# Patient Record
Sex: MALE | Race: WHITE | NOT HISPANIC OR LATINO | Employment: FULL TIME | URBAN - METROPOLITAN AREA
[De-identification: names, ages, dates, MRNs, and addresses within clinical notes are randomized per-mention and may not be internally consistent; named-entity substitution may affect disease eponyms.]

---

## 2018-11-30 ENCOUNTER — HOSPITAL ENCOUNTER (OUTPATIENT)
Dept: URGENT CARE | Facility: CLINIC | Age: 28
Setting detail: SPECIMEN
Discharge: HOME | End: 2018-11-30
Attending: FAMILY MEDICINE | Admitting: FAMILY MEDICINE

## 2018-11-30 LAB
C TRACH RRNA SPEC QL PROBE: NORMAL
N GONORRHOEA RRNA SPEC QL PROBE: NORMAL
SPECIMEN SOURCE: NORMAL

## 2019-03-01 ENCOUNTER — HOSPITAL ENCOUNTER (OUTPATIENT)
Dept: URGENT CARE | Facility: CLINIC | Age: 29
Setting detail: SPECIMEN
Discharge: HOME | End: 2019-03-01
Attending: EMERGENCY MEDICINE | Admitting: EMERGENCY MEDICINE

## 2019-03-01 LAB
BACTERIA SPEC AEROBE CULT: NORMAL
Lab: NORMAL
MICRO REPORT STATUS: NORMAL
SPECIMEN SOURCE: NORMAL

## 2020-10-08 PROCEDURE — U0003 INFECTIOUS AGENT DETECTION BY NUCLEIC ACID (DNA OR RNA); SEVERE ACUTE RESPIRATORY SYNDROME CORONAVIRUS 2 (SARS-COV-2) (CORONAVIRUS DISEASE [COVID-19]), AMPLIFIED PROBE TECHNIQUE, MAKING USE OF HIGH THROUGHPUT TECHNOLOGIES AS DESCRIBED BY CMS-2020-01-R: HCPCS | Performed by: NURSE PRACTITIONER

## 2020-10-09 ENCOUNTER — TELEPHONE (OUTPATIENT)
Dept: URGENT CARE | Facility: CLINIC | Age: 30
End: 2020-10-09

## 2025-01-20 ENCOUNTER — HOSPITAL ENCOUNTER (EMERGENCY)
Facility: HOSPITAL | Age: 35
Discharge: HOME | End: 2025-01-20
Admitting: EMERGENCY MEDICINE
Payer: COMMERCIAL

## 2025-01-20 ENCOUNTER — APPOINTMENT (OUTPATIENT)
Dept: GENERAL RADIOLOGY | Facility: HOSPITAL | Age: 35
End: 2025-01-20
Payer: COMMERCIAL

## 2025-01-20 VITALS
SYSTOLIC BLOOD PRESSURE: 134 MMHG | HEIGHT: 70 IN | RESPIRATION RATE: 16 BRPM | BODY MASS INDEX: 32.35 KG/M2 | DIASTOLIC BLOOD PRESSURE: 70 MMHG | WEIGHT: 225.97 LBS | OXYGEN SATURATION: 94 % | TEMPERATURE: 98.9 F | HEART RATE: 101 BPM

## 2025-01-20 DIAGNOSIS — R05.1 ACUTE COUGH: ICD-10-CM

## 2025-01-20 DIAGNOSIS — J18.9 COMMUNITY ACQUIRED PNEUMONIA OF RIGHT UPPER LOBE OF LUNG: Primary | ICD-10-CM

## 2025-01-20 DIAGNOSIS — R50.9 FEVER IN ADULT: ICD-10-CM

## 2025-01-20 LAB
B PARAPERT DNA SPEC QL NAA+PROBE: NOT DETECTED
B PERT DNA SPEC QL NAA+PROBE: NOT DETECTED
C PNEUM DNA NPH QL NAA+NON-PROBE: NOT DETECTED
FLUAV SUBTYP SPEC NAA+PROBE: NOT DETECTED
FLUBV RNA ISLT QL NAA+PROBE: NOT DETECTED
HADV DNA SPEC NAA+PROBE: NOT DETECTED
HCOV 229E RNA SPEC QL NAA+PROBE: NOT DETECTED
HCOV HKU1 RNA SPEC QL NAA+PROBE: NOT DETECTED
HCOV NL63 RNA SPEC QL NAA+PROBE: NOT DETECTED
HCOV OC43 RNA SPEC QL NAA+PROBE: NOT DETECTED
HMPV RNA NPH QL NAA+NON-PROBE: NOT DETECTED
HPIV1 RNA ISLT QL NAA+PROBE: NOT DETECTED
HPIV2 RNA SPEC QL NAA+PROBE: NOT DETECTED
HPIV3 RNA NPH QL NAA+PROBE: NOT DETECTED
HPIV4 P GENE NPH QL NAA+PROBE: NOT DETECTED
M PNEUMO IGG SER IA-ACNC: NOT DETECTED
RHINOVIRUS RNA SPEC NAA+PROBE: NOT DETECTED
RSV RNA NPH QL NAA+NON-PROBE: NOT DETECTED
S PYO AG THROAT QL: NEGATIVE
SARS-COV-2 RNA RESP QL NAA+PROBE: NOT DETECTED

## 2025-01-20 PROCEDURE — 99283 EMERGENCY DEPT VISIT LOW MDM: CPT

## 2025-01-20 PROCEDURE — 0202U NFCT DS 22 TRGT SARS-COV-2: CPT

## 2025-01-20 PROCEDURE — 87651 STREP A DNA AMP PROBE: CPT

## 2025-01-20 PROCEDURE — 71045 X-RAY EXAM CHEST 1 VIEW: CPT

## 2025-01-20 RX ORDER — IBUPROFEN 400 MG/1
800 TABLET, FILM COATED ORAL ONCE
Status: COMPLETED | OUTPATIENT
Start: 2025-01-20 | End: 2025-01-20

## 2025-01-20 RX ORDER — BROMPHENIRAMINE MALEATE, PSEUDOEPHEDRINE HYDROCHLORIDE, AND DEXTROMETHORPHAN HYDROBROMIDE 2; 30; 10 MG/5ML; MG/5ML; MG/5ML
5 SYRUP ORAL 4 TIMES DAILY PRN
Qty: 100 ML | Refills: 0 | Status: SHIPPED | OUTPATIENT
Start: 2025-01-20 | End: 2025-01-25

## 2025-01-20 RX ADMIN — IBUPROFEN 800 MG: 400 TABLET, FILM COATED ORAL at 10:14

## 2025-01-20 NOTE — DISCHARGE INSTRUCTIONS
Take antibiotics twice daily for the next 5 days.  Finish the entire course.  Take Bromfed as needed for cough and congestion.  Alternate use of Tylenol and ibuprofen as needed for fever.  Rest.  Increase your fluid intake.    Follow up with your primary care provider as needed. If you do not have a primary care provider, contact [facility] to establish one. Information has been provided to you in this document.     Return to the ER for new or worsening symptoms.

## 2025-01-20 NOTE — ED PROVIDER NOTES
Subjective   History of Present Illness  Patient is a 34-year-old  male with no premedical history who presents to the emergency room with complaints of a fever that been ongoing for nearly a week.  He was seen several days ago at urgent care center, where he tested negative for COVID and influenza but has not improved.  He states fever has ranged Tmax 102.6 and he has been treating it with Tylenol and ibuprofen.  He has not had Tylenol today but states he took it at 8 PM last night.  Has been managing his fever but he also complains of chest congestion, intermittent productive cough, sore throat and bodyaches.  He denies any recent travel or known exposure to illness.  He has had no abdominal pain, dysuria, chest pain.    PCP: none      Review of Systems   Constitutional:  Positive for chills and fever. Negative for appetite change.   HENT:  Positive for congestion. Negative for rhinorrhea.    Respiratory:  Positive for cough.    Cardiovascular:  Negative for chest pain.   Gastrointestinal:  Negative for abdominal pain and nausea.   Genitourinary:  Negative for dysuria.   Neurological:  Positive for headaches.   All other systems reviewed and are negative.      No past medical history on file.    No Known Allergies    No past surgical history on file.    No family history on file.    Social History     Socioeconomic History    Marital status: Single   Tobacco Use    Smoking status: Never    Smokeless tobacco: Never   Vaping Use    Vaping status: Never Used   Substance and Sexual Activity    Alcohol use: Not Currently    Drug use: Never    Sexual activity: Defer           Objective   Physical Exam  Vitals and nursing note reviewed.   Constitutional:       General: He is not in acute distress.     Appearance: Normal appearance. He is obese. He is not ill-appearing.   HENT:      Head: Normocephalic and atraumatic.   Eyes:      Pupils: Pupils are equal, round, and reactive to light.   Cardiovascular:      Rate  "and Rhythm: Normal rate and regular rhythm.      Heart sounds: Normal heart sounds. No murmur heard.  Pulmonary:      Effort: No respiratory distress.      Breath sounds: Normal breath sounds.   Abdominal:      General: Bowel sounds are normal.      Tenderness: There is no abdominal tenderness.   Neurological:      Mental Status: He is alert and oriented to person, place, and time.         Procedures           ED Course      /70   Pulse 101   Temp (!) 102.6 °F (39.2 °C) (Oral)   Resp 16   Ht 177.8 cm (70\")   Wt 102 kg (225 lb 15.5 oz)   SpO2 94%   BMI 32.42 kg/m²   Labs Reviewed   RAPID STREP A SCREEN - Normal   RESPIRATORY PANEL PCR W/ COVID-19 (SARS-COV-2), NP SWAB IN UTM/VTP, 2 HR TAT - Normal    Narrative:     In the setting of a positive respiratory panel with a viral infection PLUS a negative procalcitonin without other underlying concern for bacterial infection, consider observing off antibiotics or discontinuation of antibiotics and continue supportive care. If the respiratory panel is positive for atypical bacterial infection (Bordetella pertussis, Chlamydophila pneumoniae, or Mycoplasma pneumoniae), consider antibiotic de-escalation to target atypical bacterial infection.     Medications   ibuprofen (ADVIL,MOTRIN) tablet 800 mg (800 mg Oral Given 1/20/25 1014)     XR Chest 1 View    Result Date: 1/20/2025  Impression: Findings consistent with the appearance of right upper lobe pneumonia. Radiographic follow-up to document resolution is suggested. Electronically Signed: Belkis Allan MD  1/20/2025 11:03 AM EST  Workstation ID: [id number]                                                    Medical Decision Making  Patient is a 34-year-old obese  male with no prior medical history who presents the emergency room with complaints of fever that has been ongoing for the past week.  On exam, patient is alert and not ill-appearing.  He has normal S1/S2 without clicks murmurs.  No JVD.  Lungs " clear to auscultation in all fields.  He is not hypoxic.  Initial differentials include viral syndrome, pneumonia, strep pharyngitis, mononucleosis.  This is not a complete list.    Patient received above examination.  At time of exam, he was febrile and was given ibuprofen for treatment of fever.  Respiratory panel found to be negative.  My interpretation of chest x-ray reveals infiltration in the right upper lobe concerning for pneumonia.  This a concurrent radiologist, who notes no other acute findings.  Upon reassessment, his temperature has improved.  He is hemodynamically stable with no acute distress.  Results discussed with patient and he is agreeable to discharge with outpatient therapy at this time.  He will be discharged with prescription of Bromfed and Augmentin.  Patient is ambulatory upright, steadily without assistance upon discharge.  No acute distress noted.    I discussed the findings with patient who voices understanding of discharge instructions, signs and symptoms requiring return to the ED; discharged improved and stable condition with follow-up for reevaluation.    Patient is aware that discharge does not mean that nothing is wrong but it indicates no emergency is present and they must continue care with follow-up as given below or physician of their choice.    This document is intended for medical expert use only.  Reading of this document by patients and/or patient's family without participating medical staff guidance may result in misinterpretation and unintended morbidity.  Any interpretation of such data is the responsibility of the patient and/or family member responsible for the patient in concert with their primary or specialist providers, not to be left for sources of online search as such as [employer], [employer] or similar queries.  Relying on these approaches to knowledge may result in misinterpretation, misguided goals of care and even death should patient or family members try  recommendations outside of the realm of professional medical care in a supervised inpatient environment.    This medical document was created using Dragon dictation system. Some errors in speech recognition may occur.    Final diagnoses:   Acute cough   Fever in adult   Community acquired pneumonia of right upper lobe of lung       ED Disposition  ED Disposition       ED Disposition   Discharge    Condition   Stable    Comment   --               No follow-up provider specified.       Medication List        New Prescriptions      amoxicillin-clavulanate 875-125 MG per tablet  Commonly known as: AUGMENTIN  Take 1 tablet by mouth 2 (Two) Times a Day for 5 days.     brompheniramine-pseudoephedrine-DM 30-2-10 MG/5ML syrup  Take 5 mL by mouth 4 (Four) Times a Day As Needed for Cough or Congestion for up to 5 days.               Where to Get Your Medications        These medications were sent to [facility] - [place], [place] - [address] - [phone]  - [phone]   [address], [place] [address]      Phone: [phone]   amoxicillin-clavulanate 875-125 MG per tablet  brompheniramine-pseudoephedrine-DM 30-2-10 MG/5ML syrup            Layla Vuong, APRN  01/20/25 9661